# Patient Record
Sex: FEMALE | Race: WHITE | NOT HISPANIC OR LATINO | URBAN - METROPOLITAN AREA
[De-identification: names, ages, dates, MRNs, and addresses within clinical notes are randomized per-mention and may not be internally consistent; named-entity substitution may affect disease eponyms.]

---

## 2017-03-07 ENCOUNTER — TRANSCRIBE ORDERS (OUTPATIENT)
Dept: PHYSICAL THERAPY | Facility: HOSPITAL | Age: 52
End: 2017-03-07

## 2017-03-07 DIAGNOSIS — M25.561 RIGHT KNEE PAIN, UNSPECIFIED CHRONICITY: Primary | ICD-10-CM

## 2017-03-08 ENCOUNTER — HOSPITAL ENCOUNTER (OUTPATIENT)
Dept: PHYSICAL THERAPY | Facility: HOSPITAL | Age: 52
Setting detail: THERAPIES SERIES
Discharge: HOME | End: 2017-03-08

## 2017-03-08 DIAGNOSIS — G89.29 CHRONIC PAIN OF RIGHT KNEE: Primary | ICD-10-CM

## 2017-03-08 DIAGNOSIS — M25.561 CHRONIC PAIN OF RIGHT KNEE: Primary | ICD-10-CM

## 2017-03-08 PROCEDURE — 97161 PT EVAL LOW COMPLEX 20 MIN: CPT | Performed by: PHYSICAL THERAPIST

## 2017-03-09 NOTE — PROGRESS NOTES
Outpatient Physical Therapy Ortho Initial Evaluation  [facility]     Patient Name: Malika Flores  : 1965  MRN: [id number]  Today's Date: 3/9/2017      Visit Date: 2017    There is no problem list on file for this patient.       No past medical history on file.     No past surgical history on file.    Visit Dx:     ICD-10-CM ICD-9-CM   1. Chronic pain of right knee M25.561 719.46    G89.29 338.29             Patient History       17 0800          History    Chief Complaint Pain  -GEETA      Type of Pain Knee pain   right  -GEETA      Date Current Problem(s) Began 16  -GEETA      Brief Description of Current Complaint This 1 year-old female reports R knee pain since last summer after she pushed an ottoman across the floor with her R leg.  She has been exercising at [employer] for about the past 2 months, and thinks she may have strained her knee on leg press machine.  She has now switched from treadmill to bicycle and continues to exercise about 4x/week.  -GEETA      Onset Date- PT 3-08-17  -GEETA      Patient/Caregiver Goals Relieve pain;Know what to do to help the symptoms  -GEETA      Hand Dominance right-handed  -GEETA      Occupation/sports/leisure activities Not employed.  -GEETA      Pain     Pain Location Knee   right  -GEETA      Pain at Present 4  -GEETA      Pain at Best 4  -GEETA      Pain at Worst 7  -GEETA      What Performance Factors Make the Current Problem(s) WORSE? standing, walking, having knee completely straight.  -GEETA      What Performance Factors Make the Current Problem(s) BETTER? Keeping knee slightly bent  -GEETA      Is your sleep disturbed? Yes  -GEETA      What position do you sleep in? Right sidelying;Left sidelying  -GEETA      Difficulties with recreational activities? Tries to avoid activities that bother the knee.  -GEETA      Fall Risk Assessment    Any falls in the past year: No  -GEETA      Daily Activities    Primary Language English  -GEETA      Are you able to read Yes  -GEETA      Are you able to  write Yes  -GEETA      Teaching needs identified Home Exercise Program;Management of Condition  -GEETA      Patient is concerned about/has problems with Walking;Standing;Performing sports, recreation, and play activities;Performing home management (household chores, shopping, care of dependents);Flexibility  -GEETA      Does patient have problems with the following? Depression  -GEETA      Pt Participated in POC and Goals Yes  -GEETA      Safety    Are you being hurt, hit, or frightened by anyone at home or in your life? No  -GEETA      Are you being neglected by a caregiver No  -GEETA        User Key  (r) = Recorded By, (t) = Taken By, (c) = Cosigned By    Initials Name Provider Type    GEETA Casie Medellin, PT Physical Therapist                PT Ortho       03/09/17 0800    Subjective Pain    Able to rate subjective pain? yes  -GEETA    Pre-Treatment Pain Level 4  -GEETA    Post-Treatment Pain Level 4  -GEETA    Posture/Observations    Posture/Observations Comments Tends to keep R knee slightly flexed when standing and walking.  -GEETA    Sensation    Sensation WNL? WNL  -GEETA    Knee Palpation    Medial Joint   -GEETA    Lateral Joint   -GEETA    Knee Special Tests    Anterior drawer (ACL lesion) Negative  -GEETA    Posterior drawer (PCL lesion) Negative  -GEETA    Posterior sag sign (PCL lesion) Negative  -GEETA    Valgus stress (MCL lesion) Negative  -GEETA    Varus stress (LCL lesion) Negative  -GEETA    Apley’s compression test (meniscal lesion vs OA) Negative  -GEETA    Thessaly test (meniscal lesion) Negative  -GEETA    Prone knee flexion test (tight quads vs femoral neural tension) --   quad tightness  -GEETA    ROM (Range of Motion)    General ROM Detail WNL knee ROM, but pt. avoids full extension, particularlyin weight bearing situations.  -GEETA    Lower Extremity    Lower Ext Manual Muscle Testing Detail R hip abduction 4-/5  -GEETA    Lower Extremity Flexibility    Hamstrings Moderately limited  -GEETA    Hip Flexors Mildly limited  -GEETA    Quadriceps  Mildly limited  -GEETA    ITB Mildly limited  -GEETA    Hip Internal Rotators Mildly limited  -GEETA    Gastrocnemius Mildly limited  -GEETA    Pathomechanics    Lower Extremity Pathomechanics Antalgic with midstance  -      User Key  (r) = Recorded By, (t) = Taken By, (c) = Cosigned By    Initials Name Provider Type    GEETA Medellin PT Physical Therapist                            Therapy Education       03/08/17 1400          Therapy Education    Given HEP  -GEETA      Program New  -GEETA      How Provided Verbal;Demonstration;Written  -GEETA      Provided to Patient  -GEETA      Level of Understanding Demonstrated;Verbalized  -GEETA        User Key  (r) = Recorded By, (t) = Taken By, (c) = Cosigned By    Initials Name Provider Type    GEETA Medellin PT Physical Therapist                PT OP Goals       03/09/17 0800       PT Short Term Goals    STG Date to Achieve 04/05/17  -     STG 1 Pt. demonstrates independence in HEP.  -     STG 2 Pt. reports decrease in R knee pain to no worse than 4/10.  -     STG 3 Pt. demonstrates full knee extension during standing and walking.  -     STG 4 LEFS score is improved by 10 points or more.  -     Long Term Goals    LTG Date to Achieve 04/19/17  -     LTG 1 Pt. is independent in gym program that does not irritate knee pain.  -     LTG 2 R knee pain is no worse than 3/10 with performance of daily routine.  -     LTG 3 R LE strength and flexibility are WNL's to reduce recurrence of symptoms.  -     LTG 4 LEFS score is > 40/80.  -     Time Calculation    PT Goal Re-Cert Due Date 04/05/17  -       User Key  (r) = Recorded By, (t) = Taken By, (c) = Cosigned By    Initials Name Provider Type    GEETA Medellin PT Physical Therapist                PT Assessment/Plan       03/08/17 1400       PT Assessment    Functional Limitations Impaired gait;Limitations in functional capacity and performance;Performance in leisure activities  -GEETA     Impairments Gait;Impaired  flexibility;Muscle strength;Pain;Joint mobility;Range of motion  -GEETA     Assessment Comments Pt. presents with chronic R knee pain likely consistent woth degenerative changes.  -GEETA     Please refer to paper survey for additional self-reported information Yes  -GEETA     Rehab Potential Good  -GEETA     Patient/caregiver participated in establishment of treatment plan and goals Yes  -GEETA     Patient would benefit from skilled therapy intervention Yes  -GEETA     PT Plan    PT Frequency 2x/week  -GEETA     Predicted Duration of Therapy Intervention (days/wks) 6 weeks  -GEETA     Planned CPT's? PT EVAL LOW COMPLEXITY: 23224;PT GAIT TRAINING EA 15 MIN: 91564;PT THER PROC EA 15 MIN: 84209;PT MANUAL THERAPY EA 15 MIN: 84898;PT NEUROMUSC RE-EDUCATION EA 15 MIN: 80673;PT ULTRASOUND EA 15 MIN: 36390;PT HOT/COLD PACK WC NONMCARE: 46699;PT ELECTRICAL STIM UNATTEND:   -GEETA     PT Plan Comments PT up to 2x/week per POC.  -GEETA       User Key  (r) = Recorded By, (t) = Taken By, (c) = Cosigned By    Initials Name Provider Type    GEETA Medellin PT Physical Therapist                  Exercises       03/09/17 0800          Subjective Pain    Able to rate subjective pain? yes  -GEETA      Pre-Treatment Pain Level 4  -GEETA      Post-Treatment Pain Level 4  -GEETA        User Key  (r) = Recorded By, (t) = Taken By, (c) = Cosigned By    Initials Name Provider Type    GEETA Medellin PT Physical Therapist                              Outcome Measures       03/09/17 0800          Lower Extremity Functional Index    Any of your usual work, housework or school activities 2  -GEETA      Your usual hobbies, recreational or sporting activities 0  -GEETA      Getting into or out of the bath 2  -GEETA      Walking between rooms 1  -GEETA      Putting on your shoes or socks 2  -GEETA      Squatting 0  -GEETA      Lifting an object, like a bag of groceries from the floor 0  -GEETA      Performing light activities around your home 2  -GEETA      Performing heavy activities around  your home 2  -GEETA      Getting into or out of a car 1  -GEETA      Walking 2 blocks 1  -GEETA      Walking a mile 1  -GEETA      Going up or down 10 stairs (about 1 flight of stairs) 1  -GEETA      Standing for 1 hour 1  -GEETA      Sitting for 1 hour 1  -GEETA      Running on even ground 1  -GEETA      Running on uneven ground 0  -GEETA      Making sharp turns while running fast 1  -GEETA      Hopping 1  -GEETA      Rolling over in bed 1  -GEETA      Total 21  -GEETA      Functional Assessment    Outcome Measure Options Lower Extremity Functional Scale (LEFS)  -GEETA        User Key  (r) = Recorded By, (t) = Taken By, (c) = Cosigned By    Initials Name Provider Type    GEETA Medellin PT Physical Therapist            Time Calculation:   Start Time: 1300     Therapy Charges for Today     Code Description Service Date Service Provider Modifiers Qty    47973624750  PT EVAL LOW COMPLEXITY 3 3/8/2017 Casie Medellin, PT GP 1          PT G-Codes  Outcome Measure Options: Lower Extremity Functional Scale (LEFS)         Csaie Medellin PT  3/9/2017

## 2017-03-15 ENCOUNTER — HOSPITAL ENCOUNTER (OUTPATIENT)
Dept: PHYSICAL THERAPY | Facility: HOSPITAL | Age: 52
Setting detail: THERAPIES SERIES
Discharge: HOME | End: 2017-03-15

## 2017-03-15 DIAGNOSIS — M25.561 CHRONIC PAIN OF RIGHT KNEE: Primary | ICD-10-CM

## 2017-03-15 DIAGNOSIS — G89.29 CHRONIC PAIN OF RIGHT KNEE: Primary | ICD-10-CM

## 2017-03-15 PROCEDURE — 97110 THERAPEUTIC EXERCISES: CPT | Performed by: PHYSICAL THERAPIST

## 2017-03-15 NOTE — PROGRESS NOTES
Outpatient Physical Therapy Ortho Treatment Note  [facility]     Patient Name: Malika Flores  : 1965  MRN: [id number]  Today's Date: 3/15/2017      Visit Date: 03/15/2017    Visit Dx:    ICD-10-CM ICD-9-CM   1. Chronic pain of right knee M25.561 719.46    G89.29 338.29       There is no problem list on file for this patient.       No past medical history on file.     No past surgical history on file.          PT Ortho       03/15/17 1300    Subjective Comments    Subjective Comments Pt. has not tried her home exercises yet.  -GEETA    Subjective Pain    Able to rate subjective pain? yes  -GEETA    Pre-Treatment Pain Level 5  -GEETA    Post-Treatment Pain Level 4  -GEETA      User Key  (r) = Recorded By, (t) = Taken By, (c) = Cosigned By    Initials Name Provider Type    GEETA Medellin, PT Physical Therapist                            PT Assessment/Plan       03/15/17 1300       PT Assessment    Assessment Comments Pt. has been guarding R knee and avoiding using R knee normally for many months.  She requires cues for controlled sitting and cues to align knee with hip and ankle.  Pt. tends to allow R thigh to adduct and internally rotate unless cued.  -GEETA     PT Plan    PT Plan Comments Continue and progress exercise as tolerated.  -GEETA       User Key  (r) = Recorded By, (t) = Taken By, (c) = Cosigned By    Initials Name Provider Type    GEETA Medellin, PT Physical Therapist                    Exercises       03/15/17 1300          Subjective Comments    Subjective Comments Pt. has not tried her home exercises yet.  -GEETA      Subjective Pain    Able to rate subjective pain? yes  -GEETA      Pre-Treatment Pain Level 5  -GEETA      Post-Treatment Pain Level 4  -GEETA      Exercise 1    Exercise Name 1 Initiated exercise in clinical setting beginning with active warm-up on bicycle.  Followed with exercises emphasizing R knee mobility and strength.  Practiced supporting weight on R LE and flexing R knee during swing  phase of gait.    -GEETA        User Key  (r) = Recorded By, (t) = Taken By, (c) = Cosigned By    Initials Name Provider Type    GEETA Medellin PT Physical Therapist                                       Time Calculation:   Start Time: 1300  Total Timed Code Minutes- PT: 45 minute(s)    Therapy Charges for Today     Code Description Service Date Service Provider Modifiers Qty    [id number]  PT THER PROC EA 15 MIN 3/15/2017 Casie Medellin, PT GP 3                    Casie Medellin PT  3/15/2017

## 2017-03-23 ENCOUNTER — HOSPITAL ENCOUNTER (OUTPATIENT)
Dept: PHYSICAL THERAPY | Facility: HOSPITAL | Age: 52
Setting detail: THERAPIES SERIES
Discharge: HOME | End: 2017-03-23

## 2017-03-23 DIAGNOSIS — G89.29 CHRONIC PAIN OF RIGHT KNEE: Primary | ICD-10-CM

## 2017-03-23 DIAGNOSIS — M25.561 CHRONIC PAIN OF RIGHT KNEE: Primary | ICD-10-CM

## 2017-03-23 PROCEDURE — 97140 MANUAL THERAPY 1/> REGIONS: CPT | Performed by: PHYSICAL THERAPIST

## 2017-03-23 PROCEDURE — 97110 THERAPEUTIC EXERCISES: CPT | Performed by: PHYSICAL THERAPIST

## 2017-03-23 NOTE — PROGRESS NOTES
Outpatient Physical Therapy Ortho Treatment Note  [facility]     Patient Name: Malika Flores  : 1965  MRN: [id number]  Today's Date: 3/23/2017      Visit Date: 2017    Visit Dx:    ICD-10-CM ICD-9-CM   1. Chronic pain of right knee M25.561 719.46    G89.29 338.29       There is no problem list on file for this patient.       No past medical history on file.     No past surgical history on file.          PT Ortho       17 1100    Subjective Comments    Subjective Comments Pt. has been painting her living room.  -GEETA    Subjective Pain    Able to rate subjective pain? yes  -GEETA    Pre-Treatment Pain Level 3  -GEETA    Post-Treatment Pain Level 3  -GEETA      User Key  (r) = Recorded By, (t) = Taken By, (c) = Cosigned By    Initials Name Provider Type    GEETA Medellin PT Physical Therapist                            PT Assessment/Plan       17 1100       PT Assessment    Assessment Comments Pt. shows improvement in gait quality after treatment.  She requires cues for correct form during exercises.  -GEETA     PT Plan    PT Plan Comments Continue and progress as tolerated.  -GEETA       User Key  (r) = Recorded By, (t) = Taken By, (c) = Cosigned By    Initials Name Provider Type    GEETA Medellin PT Physical Therapist                    Exercises       17 1100          Subjective Comments    Subjective Comments Pt. has been painting her living room.  -GEETA      Subjective Pain    Able to rate subjective pain? yes  -GEETA      Pre-Treatment Pain Level 3  -GEETA      Post-Treatment Pain Level 3  -GEETA      Exercise 1    Exercise Name 1 Continued and progressed exercise in clinical setting per flow sheet emphasizing R LE strengthening and control in closed-chain situations.  -GEETA        User Key  (r) = Recorded By, (t) = Taken By, (c) = Cosigned By    Initials Name Provider Type    GEETA Medellin PT Physical Therapist                        Manual Rx (last 36 hours)      Manual Treatments        03/23/17 1100          Manual Rx 1    Manual Rx 1 Location R knee  -GEETA      Manual Rx 1 Type posterior femoral glide for extension, posterior tibial glide with IR for flexion, squeeze technique for flexion  -GEETA        User Key  (r) = Recorded By, (t) = Taken By, (c) = Cosigned By    Initials Name Provider Type    GEETA Medellin, PT Physical Therapist                        Time Calculation:   Start Time: 1010  Total Timed Code Minutes- PT: 45 minute(s)    Therapy Charges for Today     Code Description Service Date Service Provider Modifiers Qty    [id number]  PT THER PROC EA 15 MIN 3/23/2017 Casie Medellin, PT GP 2    [id number]  PT MANUAL THERAPY EA 15 MIN 3/23/2017 Casie Medellin, PT GP 1                    Casie Medellin, PT  3/23/2017

## 2017-03-27 ENCOUNTER — HOSPITAL ENCOUNTER (OUTPATIENT)
Dept: PHYSICAL THERAPY | Facility: HOSPITAL | Age: 52
Setting detail: THERAPIES SERIES
Discharge: HOME | End: 2017-03-27

## 2017-03-27 DIAGNOSIS — M25.561 CHRONIC PAIN OF RIGHT KNEE: Primary | ICD-10-CM

## 2017-03-27 DIAGNOSIS — G89.29 CHRONIC PAIN OF RIGHT KNEE: Primary | ICD-10-CM

## 2017-03-27 PROCEDURE — 97110 THERAPEUTIC EXERCISES: CPT | Performed by: PHYSICAL THERAPIST

## 2017-03-27 NOTE — PROGRESS NOTES
Outpatient Physical Therapy Ortho Treatment Note  [facility]     Patient Name: Malika Flores  : 1965  MRN: [id number]  Today's Date: 3/27/2017      Visit Date: 2017    Visit Dx:    ICD-10-CM ICD-9-CM   1. Chronic pain of right knee M25.561 719.46    G89.29 338.29       There is no problem list on file for this patient.       No past medical history on file.     No past surgical history on file.          PT Ortho       17 1500    Subjective Comments    Subjective Comments Pt. worked in her yard this weekend and went to the gym.  -GEETA    Subjective Pain    Able to rate subjective pain? yes  -GEETA    Pre-Treatment Pain Level 3  -GEETA    Post-Treatment Pain Level 2  -GEETA      User Key  (r) = Recorded By, (t) = Taken By, (c) = Cosigned By    Initials Name Provider Type    GEETA Medellin PT Physical Therapist                            PT Assessment/Plan       17 1500       PT Assessment    Assessment Comments R quad needs continued strengthening, particularly in terminal knee extension and eccentric control.  -GEETA     PT Plan    PT Plan Comments Continue PT.  Progress strengthening as tolerated.  -GEETA       User Key  (r) = Recorded By, (t) = Taken By, (c) = Cosigned By    Initials Name Provider Type    GEETA Medellin PT Physical Therapist                    Exercises       17 1500          Subjective Comments    Subjective Comments Pt. worked in her yard this weekend and went to the gym.  -GEETA      Subjective Pain    Able to rate subjective pain? yes  -GEETA      Pre-Treatment Pain Level 3  -GEETA      Post-Treatment Pain Level 2  -GEETA      Exercise 1    Exercise Name 1 Continued and progressed exercise in clinical setting per flow sheet, emphasizing R quad strengthening in OKC and CKC situations.  Also worked of hip strengthening to provide added stability to the knee.    -GEETA        User Key  (r) = Recorded By, (t) = Taken By, (c) = Cosigned By    Initials Name Provider Type    GEETA  Casie Medellin, PT Physical Therapist                                       Time Calculation:   Start Time: 1255  Total Timed Code Minutes- PT: 45 minute(s)    Therapy Charges for Today     Code Description Service Date Service Provider Modifiers Qty    [id number]  PT THER PROC EA 15 MIN 3/27/2017 Casie Medellin, PT GP 3                    Casie Medellin, PT  3/27/2017

## 2017-04-05 ENCOUNTER — HOSPITAL ENCOUNTER (OUTPATIENT)
Dept: PHYSICAL THERAPY | Facility: HOSPITAL | Age: 52
Setting detail: THERAPIES SERIES
Discharge: HOME | End: 2017-04-05

## 2017-04-05 DIAGNOSIS — G89.29 CHRONIC PAIN OF RIGHT KNEE: Primary | ICD-10-CM

## 2017-04-05 DIAGNOSIS — M25.561 CHRONIC PAIN OF RIGHT KNEE: Primary | ICD-10-CM

## 2017-04-05 PROCEDURE — 97110 THERAPEUTIC EXERCISES: CPT | Performed by: PHYSICAL THERAPIST

## 2017-04-05 NOTE — PROGRESS NOTES
Outpatient Physical Therapy Ortho Treatment Note  [facility]     Patient Name: Malika Flores  : 1965  MRN: [id number]  Today's Date: 2017      Visit Date: 2017    Visit Dx:    ICD-10-CM ICD-9-CM   1. Chronic pain of right knee M25.561 719.46    G89.29 338.29       There is no problem list on file for this patient.       No past medical history on file.     No past surgical history on file.          PT Ortho       17 1300    Subjective Comments    Subjective Comments Pt. mowed her front lawn before coming to PT today.  Her R knee feels achy (she pints to medial joint line).  -GEETA    Subjective Pain    Able to rate subjective pain? yes  -GEETA    Pre-Treatment Pain Level 4  -GEETA    Post-Treatment Pain Level 3  -GEETA      User Key  (r) = Recorded By, (t) = Taken By, (c) = Cosigned By    Initials Name Provider Type    GEETA Medellin PT Physical Therapist                            PT Assessment/Plan       17 1300       PT Assessment    Assessment Comments Pt. presented to treatment walking with R LE held in extension.  After treatment, she is able to walk with normal flexion diring swing phase of gait.  She needs continued strengthening to reduce knee strain and normalize gait pattern.  -GEETA     PT Plan    PT Plan Comments Continue PT.  Progress as tolerated.  -GEETA       User Key  (r) = Recorded By, (t) = Taken By, (c) = Cosigned By    Initials Name Provider Type    GEETA Medellin PT Physical Therapist                    Exercises       17 1300          Subjective Comments    Subjective Comments Pt. mowed her front lawn before coming to PT today.  Her R knee feels achy (she pints to medial joint line).  -GEETA      Subjective Pain    Able to rate subjective pain? yes  -GEETA      Pre-Treatment Pain Level 4  -GEETA      Post-Treatment Pain Level 3  -GEETA      Exercise 1    Exercise Name 1 Continued exercise in clinical setting per flow sheet, emphasizing concentric and eccentric R quad  control, hip stability, and LE flexibility.  -GEETA        User Key  (r) = Recorded By, (t) = Taken By, (c) = Cosigned By    Initials Name Provider Type    GEETA Medellin PT Physical Therapist                        Manual Rx (last 36 hours)      Manual Treatments       04/05/17 1300          Manual Rx 1    Manual Rx 1 Location R knee  -GEETA      Manual Rx 1 Type manual distraction to R LE  -GEETA        User Key  (r) = Recorded By, (t) = Taken By, (c) = Cosigned By    Initials Name Provider Type    GEETA Medeliln PT Physical Therapist                        Time Calculation:   Start Time: 1300  Total Timed Code Minutes- PT: 40 minute(s)    Therapy Charges for Today     Code Description Service Date Service Provider Modifiers Qty    [id number]  PT THER PROC EA 15 MIN 4/5/2017 Casie Medellin PT GP 3                    Casie Medellin PT  4/5/2017

## 2017-04-07 ENCOUNTER — HOSPITAL ENCOUNTER (OUTPATIENT)
Dept: PHYSICAL THERAPY | Facility: HOSPITAL | Age: 52
Setting detail: THERAPIES SERIES
Discharge: HOME | End: 2017-04-07

## 2017-04-07 DIAGNOSIS — G89.29 CHRONIC PAIN OF RIGHT KNEE: Primary | ICD-10-CM

## 2017-04-07 DIAGNOSIS — M25.561 CHRONIC PAIN OF RIGHT KNEE: Primary | ICD-10-CM

## 2017-04-07 PROCEDURE — 97110 THERAPEUTIC EXERCISES: CPT | Performed by: PHYSICAL THERAPIST

## 2017-04-07 PROCEDURE — 97010 HOT OR COLD PACKS THERAPY: CPT | Performed by: PHYSICAL THERAPIST

## 2017-04-07 NOTE — PROGRESS NOTES
Outpatient Physical Therapy Ortho Re-Assessment  [facility]     Patient Name: Malika Flores  : 1965  MRN: [id number]  Today's Date: 2017      Visit Date: 2017    There is no problem list on file for this patient.       No past medical history on file.     No past surgical history on file.    Visit Dx:     ICD-10-CM ICD-9-CM   1. Chronic pain of right knee M25.561 719.46    G89.29 338.29                 PT Ortho       17 1100    Knee Palpation    Medial Joint   -GEETA    Lateral Joint   -GEETA    ROM (Range of Motion)    General ROM Detail 0-120 R knee AROM.  -GEETA    Lower Extremity    Lower Ext Manual Muscle Testing Detail R hip abduction 4/5.  -GEETA    Pathomechanics    Lower Extremity Pathomechanics Limited knees flexion with swing through;Antalgic with midstance  -GEETA      17 1300    Subjective Comments    Subjective Comments Pt. mowed her front lawn before coming to PT today.  Her R knee feels achy (she pints to medial joint line).  -GEETA    Subjective Pain    Able to rate subjective pain? yes  -GEETA    Pre-Treatment Pain Level 4  -GEETA    Post-Treatment Pain Level 3  -GEETA      User Key  (r) = Recorded By, (t) = Taken By, (c) = Cosigned By    Initials Name Provider Type    GEETA Medellin PT Physical Therapist                            Therapy Education       17 1156          Therapy Education    Given Symptoms/condition management  -GEETA      Program Reinforced  -GEETA      How Provided Verbal  -GEETA      Provided to Patient  -GEETA      Level of Understanding Verbalized  -GEETA        User Key  (r) = Recorded By, (t) = Taken By, (c) = Cosigned By    Initials Name Provider Type    GEETA Med[place]in PT Physical Therapist                PT OP Goals       17 1100       PT Short Term Goals    STG Date to Achieve 17  -GEETA     STG 1 Pt. demonstrates independence in HEP.  -GEETA     STG 1 Progress Partially Met  -GEETA     STG 2 Pt. reports decrease in R knee pain to no  worse than 4/10.  -     STG 2 Progress Progressing  -     STG 3 Pt. demonstrates full knee extension during standing and walking.  -     STG 3 Progress Met  -     STG 4 LEFS score is improved by 10 points or more.  -     STG 4 Progress Met  -     Long Term Goals    LTG Date to Achieve 04/19/17  -     LTG 1 Pt. is independent in gym program that does not irritate knee pain.  -     LTG 1 Progress Ongoing  -     LTG 2 R knee pain is no worse than 3/10 with performance of daily routine.  -GEETA     LTG 2 Progress Ongoing  -GEETA     LTG 3 R LE strength and flexibility are WNL's to reduce recurrence of symptoms.  -     LTG 3 Progress Progressing  -     LTG 4 LEFS score is > 40/80.  -     LTG 4 Progress Met  -     LTG 4 Progress Comments Improve LEFS score to 55/80.  -       User Key  (r) = Recorded By, (t) = Taken By, (c) = Cosigned By    Initials Name Provider Type    GEETA Med[place], PT Physical Therapist                PT Assessment/Plan       04/07/17 1156       PT Assessment    Functional Limitations Impaired gait;Limitations in functional capacity and performance;Performance in leisure activities  -     Impairments Gait;Impaired flexibility;Muscle strength;Pain;Joint mobility;Range of motion  -     Assessment Comments Pt. is inconsistent with HEP but tends to overdo in other areas such as yard/household work and gym activities.  Functional outcome measure shows significant improvement.  Pt. will benefit from continued PT intervention to address remaining symptoms and to continue educaion in self-management of symptoms.  -GEETA     Please refer to paper survey for additional self-reported information Yes  -GEETA     Rehab Potential Good  -GEETA     Patient/caregiver participated in establishment of treatment plan and goals Yes  -GEETA     Patient would benefit from skilled therapy intervention Yes  -GEETA     PT Plan    PT Frequency 2x/week  -GEETA     Predicted Duration of Therapy Intervention  "(days/wks) 4 weeks  -GEETA     Planned CPT's? PT THER PROC EA 15 MIN: 65567;PT GAIT TRAINING EA 15 MIN: 00565;PT NEUROMUSC RE-EDUCATION EA 15 MIN: 91049;PT MANUAL THERAPY EA 15 MIN: 59693;PT ULTRASOUND EA 15 MIN: 53811;PT HOT/COLD PACK WC NONMCARE: 98789;PT ELECTRICAL STIM UNATTEND:   -GEETA     PT Plan Comments Continue PT.    -GEETA       User Key  (r) = Recorded By, (t) = Taken By, (c) = Cosigned By    Initials Name Provider Type    GEETA Med[place] PT Physical Therapist                Modalities       04/07/17 1100          Subjective Comments    Subjective Comments Pt. tried to \"keep up with her son\" at the gym last night.  Her knee is sore today.  -GEETA      Subjective Pain    Post-Treatment Pain Level 3  -GEETA      Ice    Ice Applied Yes  -GEETA      Location R knee  -GEETA      Rx Minutes 10 mins  -GEETA      Ice S/P Rx Yes  -GEETA      Patient reports will apply ice at home to involved area Yes  -GEETA        User Key  (r) = Recorded By, (t) = Taken By, (c) = Cosigned By    Initials Name Provider Type    GEETA Med[place] PT Physical Therapist              Exercises       04/07/17 1100          Subjective Comments    Subjective Comments Pt. tried to \"keep up with her son\" at the gym last night.  Her knee is sore today.  -GEETA      Subjective Pain    Able to rate subjective pain? yes  -GEETA      Pre-Treatment Pain Level 4  -GEETA      Post-Treatment Pain Level 3  -GEETA      Exercise 1    Exercise Name 1 Reassessment and selected exercises per flow sheet.  -GEETA        User Key  (r) = Recorded By, (t) = Taken By, (c) = Cosigned By    Initials Name Provider Type    GEETA Med[place] PT Physical Therapist                              Outcome Measures       04/07/17 1100          Lower Extremity Functional Index    Any of your usual work, housework or school activities 3  -GEETA      Your usual hobbies, recreational or sporting activities 3  -GEETA      Getting into or out of the bath 3  -GEETA      Walking between rooms 3  -GEETA      Putting " on your shoes or socks 3  -GEETA      Squatting 2  -GEETA      Lifting an object, like a bag of groceries from the floor 3  -GEETA      Performing light activities around your home 3  -GEETA      Performing heavy activities around your home 2  -GEETA      Getting into or out of a car 3  -GEETA      Walking 2 blocks 3  -GEETA      Walking a mile 2  -GEETA      Going up or down 10 stairs (about 1 flight of stairs) 2  -GEETA      Standing for 1 hour 2  -GEETA      Sitting for 1 hour 3  -GEETA      Running on even ground 2  -GEETA      Running on uneven ground 2  -GEETA      Making sharp turns while running fast 2  -GEETA      Hopping 0  -GEETA      Rolling over in bed 3  -GEETA      Total 49  -GEETA      Functional Assessment    Outcome Measure Options Lower Extremity Functional Scale (LEFS)  -GEETA        User Key  (r) = Recorded By, (t) = Taken By, (c) = Cosigned By    Initials Name Provider Type    GEETA Med[place], PT Physical Therapist            Time Calculation:   Start Time: 1030  Total Timed Code Minutes- PT: 35 minute(s)     Therapy Charges for Today     Code Description Service Date Service Provider Modifiers Qty    23272425804 HC PT THER PROC EA 15 MIN 4/7/2017 Casie Medellin, PT GP 2    39550717916 HC PT HOT/COLD PACK WC NONMCARE 4/7/2017 Casie Medellin, PT GP 1          PT G-Codes  Outcome Measure Options: Lower Extremity Functional Scale (LEFS)         Casie Medellin, PT  4/7/2017

## 2017-04-10 ENCOUNTER — HOSPITAL ENCOUNTER (OUTPATIENT)
Dept: PHYSICAL THERAPY | Facility: HOSPITAL | Age: 52
Setting detail: THERAPIES SERIES
Discharge: HOME | End: 2017-04-10

## 2017-04-10 DIAGNOSIS — G89.29 CHRONIC PAIN OF RIGHT KNEE: Primary | ICD-10-CM

## 2017-04-10 DIAGNOSIS — M25.561 CHRONIC PAIN OF RIGHT KNEE: Primary | ICD-10-CM

## 2017-04-10 PROCEDURE — 97110 THERAPEUTIC EXERCISES: CPT | Performed by: PHYSICAL THERAPIST

## 2017-04-10 NOTE — PROGRESS NOTES
"    Outpatient Physical Therapy Ortho Treatment Note  [facility]     Patient Name: Malika Flores  : 1965  MRN: [id number]  Today's Date: 4/10/2017      Visit Date: 04/10/2017    Visit Dx:    ICD-10-CM ICD-9-CM   1. Chronic pain of right knee M25.561 719.46    G89.29 338.29       There is no problem list on file for this patient.       No past medical history on file.     No past surgical history on file.          PT Ortho       04/10/17 1600    Subjective Comments    Subjective Comments Pt. helped a neighbor with yard work this weekend.  She almost fell, has bruise on her arm where she caught herself.  R knee is \"ouchy\" today.  -GEETA    Subjective Pain    Able to rate subjective pain? yes  -GEETA    Pre-Treatment Pain Level 3  -GEETA    Post-Treatment Pain Level 3  -GEETA      User Key  (r) = Recorded By, (t) = Taken By, (c) = Cosigned By    Initials Name Provider Type    GEETA Med[place] PT Physical Therapist                            PT Assessment/Plan       04/10/17 1600       PT Assessment    Assessment Comments Pt. has developed habitual compensatory strategies with gait.  She will require additional training to develop better muscular control and better gait quality.  -GEETA     PT Plan    PT Plan Comments Continue PT.    -GEETA       User Key  (r) = Recorded By, (t) = Taken By, (c) = Cosigned By    Initials Name Provider Type    GEETA Med[place] PT Physical Therapist                    Exercises       04/10/17 1600          Subjective Comments    Subjective Comments Pt. helped a neighbor with yard work this weekend.  She almost fell, has bruise on her arm where she caught herself.  R knee is \"ouchy\" today.  -GEETA      Subjective Pain    Able to rate subjective pain? yes  -GEETA      Pre-Treatment Pain Level 3  -GEETA      Post-Treatment Pain Level 3  -GEETA      Exercise 1    Exercise Name 1 Pt. ambulates into department holding R LE stiffly in extension during swing phase of gait and with shortened stance time on R " LE.  Exercise today emphasized shifting weight onto R LE while keeping hip straight, as well as keeping knees aligned over feet with eccentric quad control during knee bending.  Had pt. walk toward mirror to improve awareness of gait deviations.  She is able to correct with cueing, but quickly reverts back to compensatory patterns without cueing.  -GEETA        User Key  (r) = Recorded By, (t) = Taken By, (c) = Cosigned By    Initials Name Provider Type    GEETA Med[place]in PT Physical Therapist                                       Time Calculation:   Start Time: 1515  Total Timed Code Minutes- PT: 45 minute(s)    Therapy Charges for Today     Code Description Service Date Service Provider Modifiers Qty    76678142172 HC PT THER PROC EA 15 MIN 4/10/2017 Casie Medellin, PT GP 3                    Casie Medellin PT  4/10/2017

## 2017-04-13 ENCOUNTER — HOSPITAL ENCOUNTER (OUTPATIENT)
Dept: PHYSICAL THERAPY | Facility: HOSPITAL | Age: 52
Setting detail: THERAPIES SERIES
Discharge: HOME | End: 2017-04-13

## 2017-04-13 DIAGNOSIS — M25.561 CHRONIC PAIN OF RIGHT KNEE: Primary | ICD-10-CM

## 2017-04-13 DIAGNOSIS — G89.29 CHRONIC PAIN OF RIGHT KNEE: Primary | ICD-10-CM

## 2017-04-13 PROCEDURE — 97110 THERAPEUTIC EXERCISES: CPT | Performed by: PHYSICAL THERAPIST

## 2017-04-13 NOTE — PROGRESS NOTES
"    Outpatient Physical Therapy Ortho Treatment Note  [facility]     Patient Name: Malika Flores  : 1965  MRN: [id number]  Today's Date: 2017      Visit Date: 2017    Visit Dx:    ICD-10-CM ICD-9-CM   1. Chronic pain of right knee M25.561 719.46    G89.29 338.29       There is no problem list on file for this patient.       No past medical history on file.     No past surgical history on file.          PT Ortho       17 1500    Subjective Comments    Subjective Comments Knee feels better.  Pt. says she has not been on it as much.  -GEETA    Subjective Pain    Able to rate subjective pain? yes  -GEETA    Pre-Treatment Pain Level 3  -GEETA    Post-Treatment Pain Level 3  -GEETA      04/10/17 1600    Subjective Comments    Subjective Comments Pt. helped a neighbor with yard work this weekend.  She almost fell, has bruise on her arm where she caught herself.  R knee is \"ouchy\" today.  -GEETA    Subjective Pain    Able to rate subjective pain? yes  -GEETA    Pre-Treatment Pain Level 3  -GEETA    Post-Treatment Pain Level 3  -      User Key  (r) = Recorded By, (t) = Taken By, (c) = Cosigned By    Initials Name Provider Type    GEETA Med[place] PT Physical Therapist                            PT Assessment/Plan       17 1500       PT Assessment    Assessment Comments Gait quality shows improvement, but pt. still needs cueing at times to avoid reverting back to stiff-legged gait pattern.  -     PT Plan    PT Plan Comments Decrease frequency to 1x/week for 2 more weeks, then asses readiness for independent exercise vs. need for ongoing treatment.  -       User Key  (r) = Recorded By, (t) = Taken By, (c) = Cosigned By    Initials Name Provider Type    GEETA Med[place] PT Physical Therapist                    Exercises       17 1500          Subjective Comments    Subjective Comments Knee feels better.  Pt. says she has not been on it as much.  -GEETA      Subjective Pain    Able to rate " subjective pain? yes  -GEETA      Pre-Treatment Pain Level 3  -GEETA      Post-Treatment Pain Level 3  -GEETA      Exercise 1    Exercise Name 1 Continued exercise in clinical setting per flow sheet, and added new exercises to HEP.  These included CKC hip abductor strengthening and high march.  -GEETA        User Key  (r) = Recorded By, (t) = Taken By, (c) = Cosigned By    Initials Name Provider Type    GEETA Med[place]in, ENZO Physical Therapist                                       Time Calculation:   Start Time: 1455  Total Timed Code Minutes- PT: 35 minute(s)    Therapy Charges for Today     Code Description Service Date Service Provider Modifiers Qty    08834212743  PT THER PROC EA 15 MIN 4/13/2017 Casie Medellin, PT GP 2                    Casie Medellin PT  4/13/2017

## 2017-04-20 ENCOUNTER — HOSPITAL ENCOUNTER (OUTPATIENT)
Dept: PHYSICAL THERAPY | Facility: HOSPITAL | Age: 52
Setting detail: THERAPIES SERIES
Discharge: HOME | End: 2017-04-20

## 2017-04-20 DIAGNOSIS — M25.561 CHRONIC PAIN OF RIGHT KNEE: Primary | ICD-10-CM

## 2017-04-20 DIAGNOSIS — G89.29 CHRONIC PAIN OF RIGHT KNEE: Primary | ICD-10-CM

## 2017-04-20 PROCEDURE — 97110 THERAPEUTIC EXERCISES: CPT | Performed by: PHYSICAL THERAPIST

## 2017-04-20 NOTE — PROGRESS NOTES
Outpatient Physical Therapy Ortho Treatment Note  [facility]     Patient Name: Malika Flores  : 1965  MRN: [id number]  Today's Date: 2017      Visit Date: 2017    Visit Dx:    ICD-10-CM ICD-9-CM   1. Chronic pain of right knee M25.561 719.46    G89.29 338.29       There is no problem list on file for this patient.       No past medical history on file.     No past surgical history on file.          PT Ortho       17 1300    Subjective Comments    Subjective Comments Pt. has decreased intensity of activity and knee is feeling better.  -GEETA    Subjective Pain    Able to rate subjective pain? yes  -GEETA    Pre-Treatment Pain Level 2  -GEETA    Post-Treatment Pain Level 2  -GEETA      User Key  (r) = Recorded By, (t) = Taken By, (c) = Cosigned By    Initials Name Provider Type    GEETA Med[place] PT Physical Therapist                            PT Assessment/Plan       17 1300       PT Assessment    Assessment Comments Pt. continues to require cues for best technique with exercises, but shows increasing control and tolerance for exercise without increase in knee pain.  -GEETA     PT Plan    PT Plan Comments Continue weekly for 3 weeks.  -GEETA       User Key  (r) = Recorded By, (t) = Taken By, (c) = Cosigned By    Initials Name Provider Type    GEETA Med[place] PT Physical Therapist                    Exercises       17 1300          Subjective Comments    Subjective Comments Pt. has decreased intensity of activity and knee is feeling better.  -GEETA      Subjective Pain    Able to rate subjective pain? yes  -GEETA      Pre-Treatment Pain Level 2  -GEETA      Post-Treatment Pain Level 2  -GEETA      Exercise 1    Exercise Name 1 Continued and progressed exercise in clinical setting emphasizing eccentric quad control.  Added wall slide squat and sidestep with green band to HEP.  -GEETA        User Key  (r) = Recorded By, (t) = Taken By, (c) = Cosigned By    Initials Name Provider Type    GEETA Jason  PEGGY Med[place]in, PT Physical Therapist                                       Time Calculation:   Start Time: 1355  Total Timed Code Minutes- PT: 45 minute(s)    Therapy Charges for Today     Code Description Service Date Service Provider Modifiers Qty    89262025209  PT THER PROC EA 15 MIN 4/20/2017 Casie Medellin, PT GP 3                    Casie Medellin, PT  4/20/2017

## 2017-04-27 ENCOUNTER — HOSPITAL ENCOUNTER (OUTPATIENT)
Dept: PHYSICAL THERAPY | Facility: HOSPITAL | Age: 52
Setting detail: THERAPIES SERIES
Discharge: HOME | End: 2017-04-27

## 2017-04-27 DIAGNOSIS — G89.29 CHRONIC PAIN OF RIGHT KNEE: Primary | ICD-10-CM

## 2017-04-27 DIAGNOSIS — M25.561 CHRONIC PAIN OF RIGHT KNEE: Primary | ICD-10-CM

## 2017-04-27 PROCEDURE — 97110 THERAPEUTIC EXERCISES: CPT | Performed by: PHYSICAL THERAPIST

## 2017-04-27 NOTE — PROGRESS NOTES
Outpatient Physical Therapy Ortho Treatment Note  [facility]     Patient Name: Malika Flores  : 1965  MRN: [id number]  Today's Date: 2017      Visit Date: 2017    Visit Dx:    ICD-10-CM ICD-9-CM   1. Chronic pain of right knee M25.561 719.46    G89.29 338.29       There is no problem list on file for this patient.       No past medical history on file.     No past surgical history on file.          PT Ortho       17 1300    Subjective Comments    Subjective Comments Pt. is doing better overall.  She is trying to go a little easier on her knee.  -GEETA    Subjective Pain    Able to rate subjective pain? yes  -GEETA    Pre-Treatment Pain Level 2  -GEETA    Post-Treatment Pain Level 2  -GEETA      User Key  (r) = Recorded By, (t) = Taken By, (c) = Cosigned By    Initials Name Provider Type    GEETA Med[place] PT Physical Therapist                            PT Assessment/Plan       17 1300       PT Assessment    Assessment Comments Pt. shows improvement in overall tolerance for activity without increase in knee pain.  -GEETA     PT Plan    PT Plan Comments Continue weekly for 2 weeks, then transition to independent exercise.  -GEETA       User Key  (r) = Recorded By, (t) = Taken By, (c) = Cosigned By    Initials Name Provider Type    GEETA Med[place]in, ENZO Physical Therapist                    Exercises       17 1300          Subjective Comments    Subjective Comments Pt. is doing better overall.  She is trying to go a little easier on her knee.  -GEETA      Subjective Pain    Able to rate subjective pain? yes  -GEETA      Pre-Treatment Pain Level 2  -GEETA      Post-Treatment Pain Level 2  -GEETA      Exercise 1    Exercise Name 1 Continued and progressed exercise per flow sheet, emphasizing R knee strength and stability.  -GEETA        User Key  (r) = Recorded By, (t) = Taken By, (c) = Cosigned By    Initials Name Provider Type    GEETA Med[place], ENZO Physical Therapist                                        Time Calculation:   Start Time: 1300  Total Timed Code Minutes- PT: 40 minute(s)    Therapy Charges for Today     Code Description Service Date Service Provider Modifiers Qty    [id number]  PT THER PROC EA 15 MIN 4/27/2017 Casie Medellin, PT GP 3                    Casie Medellin, PT  4/27/2017

## 2017-05-02 ENCOUNTER — HOSPITAL ENCOUNTER (OUTPATIENT)
Dept: PHYSICAL THERAPY | Facility: HOSPITAL | Age: 52
Setting detail: THERAPIES SERIES
Discharge: HOME | End: 2017-05-02

## 2017-05-02 DIAGNOSIS — M25.561 CHRONIC PAIN OF RIGHT KNEE: Primary | ICD-10-CM

## 2017-05-02 DIAGNOSIS — G89.29 CHRONIC PAIN OF RIGHT KNEE: Primary | ICD-10-CM

## 2017-05-02 PROCEDURE — 97110 THERAPEUTIC EXERCISES: CPT | Performed by: PHYSICAL THERAPIST

## 2017-05-10 ENCOUNTER — APPOINTMENT (OUTPATIENT)
Dept: PHYSICAL THERAPY | Facility: HOSPITAL | Age: 52
End: 2017-05-10

## 2019-11-29 PROBLEM — R35.0 URINARY FREQUENCY: Status: ACTIVE | Noted: 2019-11-29

## 2019-11-29 PROCEDURE — 87086 URINE CULTURE/COLONY COUNT: CPT | Performed by: NURSE PRACTITIONER

## 2019-12-02 ENCOUNTER — TELEPHONE (OUTPATIENT)
Dept: URGENT CARE | Facility: CLINIC | Age: 54
End: 2019-12-02

## 2019-12-03 ENCOUNTER — TELEPHONE (OUTPATIENT)
Dept: URGENT CARE | Facility: CLINIC | Age: 54
End: 2019-12-03